# Patient Record
(demographics unavailable — no encounter records)

---

## 2025-01-07 NOTE — PHYSICAL EXAM
[TextEntry] : General: alert, well appearing, no distress HEENT: no hair thinning or alopecia, clear conjunctiva, no oral or nasal ulcers, no cervical lymphadenopathy Cardiac: S1+, S2+,normal rate and rhythm, no murmur, rubs or gallops Pulm: normal respiratory effort, clear to auscultation bilaterally GI: abdomen soft, non-tender and non-distended   MSK: Hand-DIP joints, PIP joints, , CMC joints without evidence of synovitis or effusion. Intact and nonpainful range of motion. No Heberden/Ciro nodes. Rt1st MCP joint tender+ Wrist, elbow, without evidence of synovitis or effusion. Intact and nonpainful range of motion. Rt shoulder joint: painful abduction past 90 deg. Positive Hawkin's and empty can test Foot/ankle/knee/hip joints without erythema/effusion/tenderness to palpation and with intact nonpainful range of motion. Spine: normal appearance, no tenderness, lateral flexion limited   Skin: No rashes, warm and well-perfused. No nail pitting, digital ulceration, dactylitis, or telangiectasias. No subcutaneous nodules.

## 2025-01-07 NOTE — REVIEW OF SYSTEMS
[TextEntry] :  Head: No Alopecia, no scalp pain, no temporal headaches, no hearing loss,+red/painful eyes, no dry eyes ,no dry mouth, no painless oral ulcers,no poor dentition,no jaw claudication ENT: No enlarged lymph nodes, no parotid swelling CVS: No Positional chest pain Pulm: No SOB, no cough, no pleuritic chest pain Abdomen:  No constipation,no diarrhea, no postprandial pain Skin: No rash,no dry skin,no tight skin,no nodules, no Raynaud's MSK: + joint pain,no joint swelling,no morning stiffness, no back pain :   no blood in urine Heme: No clots,no hx of low WBC,no hx of low Hb,no hx of low platelets Neuro: No tingling,no numbness,no foot drop, no weakness, no seizures, no temporal headaches Systemic: No fevers, no weight loss, no night sweats No H/o radiation therapy, no recent hospitalization

## 2025-01-07 NOTE — HISTORY OF PRESENT ILLNESS
[FreeTextEntry1] : 51yo M with PMHx of T2DM,GERD for past 20yrs, HLD, depression  self referred presents with complaints of joint pains, ?vision problem.  On fibrate  Lower back, rt>left hips, left shoulder, b/l PIP joints.  Hands: Pain started 8yrs back, activity makes it worse, no am stiffness?? Ring never been stuck  Rt hip pain worse with walking and better with rest. No PT. Cortisone shots helped to some extent.  Low back pain-started 10yrs back. Night time awakenings, Has pain not stiffness. Hard to get out of a car.  Left shoulder- lifting after a point is painful. Activity gets worse. No swelling.  No dactylitis,  Left eye- woke up with redness in corner. Pain+, tearing+ clear Went to opthal, was given prednisone. Saw Russel Zavala at [facility].

## 2025-01-07 NOTE — HISTORY OF PRESENT ILLNESS
[FreeTextEntry1] : 49yo M with PMHx of T2DM,GERD for past 20yrs, HLD, depression  self referred presents with complaints of joint pains, ?vision problem.  On fibrate  Lower back, rt>left hips, left shoulder, b/l PIP joints.  Hands: Pain started 8yrs back, activity makes it worse, no am stiffness?? Ring never been stuck  Rt hip pain worse with walking and better with rest. No PT. Cortisone shots helped to some extent.  Low back pain-started 10yrs back. Night time awakenings, Has pain not stiffness. Hard to get out of a car.  Left shoulder- lifting after a point is painful. Activity gets worse. No swelling.  No dactylitis,  Left eye- woke up with redness in corner. Pain+, tearing+ clear Went to opthal, was given prednisone. Saw Russel Zavala at [facility].

## 2025-01-07 NOTE — HISTORY OF PRESENT ILLNESS
[FreeTextEntry1] : 51yo M with PMHx of T2DM,GERD for past 20yrs, HLD, depression  self referred presents with complaints of joint pains, ?vision problem.  On fibrate  Lower back, rt>left hips, left shoulder, b/l PIP joints.  Hands: Pain started 8yrs back, activity makes it worse, no am stiffness?? Ring never been stuck  Rt hip pain worse with walking and better with rest. No PT. Cortisone shots helped to some extent.  Low back pain-started 10yrs back. Night time awakenings, Has pain not stiffness. Hard to get out of a car.  Left shoulder- lifting after a point is painful. Activity gets worse. No swelling.  No dactylitis,  Left eye- woke up with redness in corner. Pain+, tearing+ clear Went to opthal, was given prednisone. Saw Russel Zavala at [facility].

## 2025-02-12 NOTE — HISTORY OF PRESENT ILLNESS
[FreeTextEntry1] : 51yo M with PMHx of T2DM,GERD for past 20yrs, HLD, depression  self referred initially presented with complaints of joint pains, red eye. Today he is here for follow up.   Interim Hx: Today he says he gets pain and stiffness of b/l 3rd and 4th fingers lasting >1hr, movement makes it better. He is currently off prednisone, was told he does not have to be on prednisone by opthal.  Received notes from ophthalmology at [facility] diagnosed to have scleritis left eye, posterior vitreous detachment both eyes. Today he is assymptomatic. Had MRI pelvis done.  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  To follow-up with rheumatology for steroid sparing agent.  Pred forte was discontinued by ophthalmology. X-ray of left SI joint seems sclerotic, irregular compared to right.  Read as normal. Has hip pain that worsens with activity at times. History not clear.   Rheum initial Hx:  Lower back, rt>left hips, left shoulder, b/l PIP joints.  Hands: Pain started 8yrs back, activity makes it worse, no am stiffness?? Ring never been stuck  Rt hip pain worse with walking and better with rest. No PT. Cortisone shots helped to some extent.  Low back pain-started 10yrs back. Night time awakenings, Has pain not stiffness. Hard to get out of a car.  Left shoulder- lifting after a point is painful. Activity gets worse. No swelling.  No dactylitis,  Left eye- woke up with redness in corner. Pain+, tearing+ [facility] Saw Russel Zavala at [facility].

## 2025-02-12 NOTE — PHYSICAL EXAM
[TextEntry] : General: alert, well appearing, no distress HEENT: no hair thinning or alopecia, clear conjunctiva, no oral or nasal ulcers, no cervical lymphadenopathy Cardiac: S1+, S2+,normal rate and rhythm, no murmur, rubs or gallops Pulm: normal respiratory effort, clear to auscultation bilaterally GI: abdomen soft, non-tender and non-distended  MSK: Hand-DIP joints, PIP joints, , CMC joints without evidence of synovitis or effusion. Intact and nonpainful range of motion. No Heberden/Ciro nodes.  Wrist, elbow, without evidence of synovitis or effusion. Intact and nonpainful range of motion. Rt shoulder joint: painful abduction past 90 deg. Positive Hawkin's and empty can test Foot/ankle/knee/hip joints without erythema/effusion/tenderness to palpation and with intact nonpainful range of motion. Spine: normal appearance, no tenderness, lateral flexion limited  Skin: No rashes, warm and well-perfused. No nail pitting, digital ulceration, dactylitis, or telangiectasias. No subcutaneous nodules.

## 2025-02-12 NOTE — HISTORY OF PRESENT ILLNESS
[FreeTextEntry1] : 51yo M with PMHx of T2DM,GERD for past 20yrs, HLD, depression  self referred initially presented with complaints of joint pains, red eye. Today he is here for follow up.   Interim Hx: Today he says he gets pain and stiffness of b/l 3rd and 4th fingers lasting >1hr, movement makes it better. He is currently off prednisone, was told he does not have to be on prednisone by opthal.  Received notes from ophthalmology at [facility] diagnosed to have scleritis left eye, posterior vitreous detachment both eyes. Today he is assymptomatic. Had MRI pelvis done.  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  To follow-up with rheumatology for steroid sparing agent.  Pred forte was discontinued by ophthalmology. X-ray of left SI joint seems sclerotic, irregular compared to right.  Read as normal. Has hip pain that worsens with activity at times. History not clear.   Rheum initial Hx:  Lower back, rt>left hips, left shoulder, b/l PIP joints.  Hands: Pain started 8yrs back, activity makes it worse, no am stiffness?? Ring never been stuck  Rt hip pain worse with walking and better with rest. No PT. Cortisone shots helped to some extent.  Low back pain-started 10yrs back. Night time awakenings, Has pain not stiffness. Hard to get out of a car.  Left shoulder- lifting after a point is painful. Activity gets worse. No swelling.  No dactylitis,  Left eye- woke up with redness in corner. Pain+, tearing+ clear Went to [facility]", was given prednisone. Saw Russel Zavala at [facility].

## 2025-03-13 NOTE — HISTORY OF PRESENT ILLNESS
[FreeTextEntry1] : 51yo M with PMHx of T2DM,GERD for past 20yrs, HLD, depression  self referred initially presented with complaints of joint pains, red eye. Today he is here for follow up.   Interim Hx: Will discuss possible treatment with mtx if necessary. No sacroiliitis and minimal hip effusion b/l that does not seem significant. In the last visit, he reported he gets pain and stiffness of b/l 3rd and 4th fingers lasting >1hr, movement makes it better. He was off prednisone, was told he does not have to be on prednisone by opthal.  Received notes from ophthalmology at [facility] diagnosed to have scleritis left eye, posterior vitreous detachment both eyes. Today he is assymptomatic. Had MRI pelvis done.  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  To follow-up with rheumatology for steroid sparing agent.  Pred forte was discontinued by ophthalmology. X-ray of left SI joint seems sclerotic, irregular compared to right.  Read as normal. Has hip pain that worsens with activity at times. History not clear.   Rheum initial Hx:  Lower back, rt>left hips, left shoulder, b/l PIP joints.  Hands: Pain started 8yrs back, activity makes it worse, no am stiffness?? Ring never been stuck  Rt hip pain worse with walking and better with rest. No PT. Cortisone shots helped to some extent.  Low back pain-started 10yrs back. Night time awakenings, Has pain not stiffness. Hard to get out of a car.  Left shoulder- lifting after a point is painful. Activity gets worse. No swelling.  No dactylitis,  Left eye- woke up with redness in corner. Pain+, tearing+ clear Went to opthal, was given prednisone. Saw Russel Zavala at [facility].

## 2025-03-13 NOTE — PHYSICAL EXAM
[TextEntry] : General: alert, well appearing, no distress HEENT: no hair thinning or alopecia, clear conjunctiva, no oral or nasal ulcers, no cervical lymphadenopathy Cardiac: S1+, S2+,normal rate and rhythm, no murmur, rubs or gallops Pulm: normal respiratory effort, clear to auscultation bilaterally GI: abdomen soft, non-tender and non-distended  MSK: Hand-DIP joints, PIP joints, , CMC joints without evidence of synovitis or effusion. Intact and nonpainful range of motion. No Heberden/Ciro nodes.  Wrist, elbow, without evidence of synovitis or effusion. Intact and nonpainful range of motion. Rt shoulder joint: painful abduction past 90 deg. Positive Hawkin's and empty can test Foot/ankle/knee without erythema/effusion/tenderness to palpation and with intact nonpainful range of motion. Hips-tenderness over lateral side bilaterally Spine: normal appearance, no tenderness, lateral flexion limited  Skin: No rashes, warm and well-perfused. No nail pitting, digital ulceration, dactylitis, or telangiectasias. No subcutaneous nodules.

## 2025-03-13 NOTE — HISTORY OF PRESENT ILLNESS
[FreeTextEntry1] : 51yo M with PMHx of T2DM,GERD for past 20yrs, HLD, depression  self referred initially presented with complaints of joint pains, red eye. Today he is here for follow up.   Interim Hx: Will discuss possible treatment with mtx if necessary. No sacroiliitis and minimal hip effusion b/l that does not seem significant. In the last visit, he reported he gets pain and stiffness of b/l 3rd and 4th fingers lasting >1hr, movement makes it better. He was off prednisone, was told he does not have to be on prednisone by opthal.  Received notes from ophthalmology at [facility] diagnosed to have scleritis left eye, posterior vitreous detachment both eyes. Today he is assymptomatic. Had MRI pelvis done.  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  To follow-up with rheumatology for steroid sparing agent.  Pred forte was discontinued by ophthalmology. X-ray of left SI joint seems sclerotic, irregular compared to right.  Read as normal. Has hip pain that worsens with activity at times. History not clear.   Rheum initial Hx:  Lower back, rt>left hips, left shoulder, b/l PIP joints.  Hands: Pain started 8yrs back, activity makes it worse, no am stiffness?? Ring never been stuck  Rt hip pain worse with walking and better with rest. No PT. Cortisone shots helped to some extent.  Low back pain-started 10yrs back. Night time awakenings, Has pain not stiffness. Hard to get out of a car.  Left shoulder- lifting after a point is painful. Activity gets worse. No swelling.  No dactylitis,  Left eye- woke up with redness in corner. Pain+, tearing+ clear Went to opthal, was given prednisone. Saw Russel Zavala at [facility].

## 2025-04-10 NOTE — HISTORY OF PRESENT ILLNESS
[FreeTextEntry1] : 49yo M with PMHx of T2DM,GERD for past 20yrs, HLD, depression  self referred initially presented with complaints of joint pains, red eye. Today he is here for follow up.   Interim Hx: Says he is better but not completely. Left ankle pain after sprain 2 wks back.  Stepped over uneven surface while in [place].  Hip pain is also better.  Stiffness of fingers little better. Was taking tylenol for pain. No sacroiliitis and minimal hip effusion b/l that does not seem significant.   Rheum initial Hx:  Lower back, rt>left hips, left shoulder, b/l PIP joints.  Hands: Pain started 8yrs back, activity makes it worse, no am stiffness?? Ring never been stuck  Rt hip pain worse with walking and better with rest. No PT. Cortisone shots helped to some extent.  Low back pain-started 10yrs back. Night time awakenings, Has pain not stiffness. Hard to get out of a car.  Left shoulder- lifting after a point is painful. Activity gets worse. No swelling.  No dactylitis,  Left eye- woke up with redness in corner. Pain+, tearing+ clear Went to opthal, was given prednisone. Saw Russel Zavala at [facility].  he reported he gets pain and stiffness of b/l 3rd and 4th fingers lasting >1hr, movement makes it better. He was off prednisone, was told he does not have to be on prednisone by opthal.  Received notes from ophthalmology at [facility] diagnosed to have scleritis left eye, posterior vitreous detachment both eyes. Today he is assymptomatic. Had MRI pelvis done.  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  To follow-up with rheumatology for steroid sparing agent.  Pred forte was discontinued by ophthalmology. X-ray of left SI joint seems sclerotic, irregular compared to right.  Read as normal. Has hip pain that worsens with activity at times. History not clear.

## 2025-04-10 NOTE — HISTORY OF PRESENT ILLNESS
[FreeTextEntry1] : 51yo M with PMHx of T2DM,GERD for past 20yrs, HLD, depression  self referred initially presented with complaints of joint pains, red eye. Today he is here for follow up.   Interim Hx: Says he is better but not completely. Left ankle pain after sprain 2 wks back.  Stepped over uneven surface while in [place].  Hip pain is also better.  Stiffness of fingers little better. Was taking tylenol for pain. No sacroiliitis and minimal hip effusion b/l that does not seem significant.   Rheum initial Hx:  Lower back, rt>left hips, left shoulder, b/l PIP joints.  Hands: Pain started 8yrs back, activity makes it worse, no am stiffness?? Ring never been stuck  Rt hip pain worse with walking and better with rest. No PT. Cortisone shots helped to some extent.  Low back pain-started 10yrs back. Night time awakenings, Has pain not stiffness. Hard to get out of a car.  Left shoulder- lifting after a point is painful. Activity gets worse. No swelling.  No dactylitis,  Left eye- woke up with redness in corner. Pain+, tearing+ clear Went to opthal, was given prednisone. Saw Russel Zavala at [facility].  he reported he gets pain and stiffness of b/l 3rd and 4th fingers lasting >1hr, movement makes it better. He was off prednisone, was told he does not have to be on prednisone by opthal.  Received notes from ophthalmology at [facility] diagnosed to have scleritis left eye, posterior vitreous detachment both eyes. Today he is assymptomatic. Had MRI pelvis done.  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  To follow-up with rheumatology for steroid sparing agent.  Pred forte was discontinued by ophthalmology. X-ray of left SI joint seems sclerotic, irregular compared to right.  Read as normal. Has hip pain that worsens with activity at times. History not clear.

## 2025-04-10 NOTE — PHYSICAL EXAM
[TextEntry] : General: alert, well appearing, no distress HEENT: no hair thinning or alopecia, clear conjunctiva, no oral or nasal ulcers, no cervical lymphadenopathy Cardiac: S1+, S2+,normal rate and rhythm, no murmur, rubs or gallops Pulm: normal respiratory effort, clear to auscultation bilaterally GI: abdomen soft, non-tender and non-distended  MSK: Hand-DIP joints, PIP joints, , CMC joints without evidence of synovitis or effusion. Intact and nonpainful range of motion. No Heberden/Ciro nodes.  Wrist, elbow, without evidence of synovitis or effusion. Intact and nonpainful range of motion. Rt shoulder joint: painful abduction past 90 deg. Positive Hawkin's and empty can test Foot/knee without erythema/effusion/tenderness to palpation and with intact nonpainful range of motion. Left ankle-swelling and tenderness along lateral malleolus Hips-tenderness over lateral side bilaterally Spine: normal appearance, no tenderness, lateral flexion limited  Skin: No rashes, warm and well-perfused. No nail pitting, digital ulceration, dactylitis, or telangiectasias. No subcutaneous nodules.

## 2025-05-08 NOTE — HISTORY OF PRESENT ILLNESS
[FreeTextEntry1] : 49yo M with PMHx of DEJUAN not on CPAP, T2DM,GERD for past 20yrs, HLD, depression  self referred initially presented with complaints of joint pains, red eye. Today he is here for follow up.   Interim Hx: On methotrexate.  Said he was feeling better in the last visit but today he does not feel he is better.  Says he is back to how it was in the beginning.  He also mentions that he does not sleep well at night and does not use CPAP.  No sacroiliitis and minimal hip effusion b/l that does not seem significant.   Rheum initial Hx:  Lower back, rt>left hips, left shoulder, b/l PIP joints.  Hands: Pain started 8yrs back, activity makes it worse, no am stiffness?? Ring never been stuck  Rt hip pain worse with walking and better with rest. No PT. Cortisone shots helped to some extent.  Low back pain-started 10yrs back. Night time awakenings, Has pain not stiffness. Hard to get out of a car.  Left shoulder- lifting after a point is painful. Activity gets worse. No swelling.  No dactylitis,  Left eye- woke up with redness in corner. Pain+, tearing+ clear Went to opthal, was given prednisone. Saw Russel Zavala at [facility].  he reported he gets pain and stiffness of b/l 3rd and 4th fingers lasting >1hr, movement makes it better. He was off prednisone, was told he does not have to be on prednisone by opthal.  Received notes from ophthalmology at [facility] diagnosed to have scleritis left eye, posterior vitreous detachment both eyes. Today he is assymptomatic. Had MRI pelvis done.  In the last visit in Jan 2025, Dr. 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  Pred forte was discontinued by ophthalmology. X-ray of left SI joint seems sclerotic, irregular compared to right.  Read as normal. Has hip pain that worsens with activity at times. History not clear.   He was started on methotrexate/folic acid.  After 4 weeks he told me that he is feeling better overall.  Then at 8 weeks he said his joint symptoms are all back.  He has DEJUAN, does not sleep well.

## 2025-05-08 NOTE — HISTORY OF PRESENT ILLNESS
[FreeTextEntry1] : 49yo M with PMHx of DEJUAN not on CPAP, T2DM,GERD for past 20yrs, HLD, depression  self referred initially presented with complaints of joint pains, red eye. Today he is here for follow up.   Interim Hx: On methotrexate.  Said he was feeling better in the last visit but today he does not feel he is better.  Says he is back to how it was in the beginning.  He also mentions that he does not sleep well at night and does not use CPAP.  No sacroiliitis and minimal hip effusion b/l that does not seem significant.   Rheum initial Hx:  Lower back, rt>left hips, left shoulder, b/l PIP joints.  Hands: Pain started 8yrs back, activity makes it worse, no am stiffness?? Ring never been stuck  Rt hip pain worse with walking and better with rest. No PT. Cortisone shots helped to some extent.  Low back pain-started 10yrs back. Night time awakenings, Has pain not stiffness. Hard to get out of a car.  Left shoulder- lifting after a point is painful. Activity gets worse. No swelling.  No dactylitis,  Left eye- woke up with redness in corner. Pain+, tearing+ clear Went to opthal, was given prednisone. Saw Russel Zavala at [facility].  he reported he gets pain and stiffness of b/l 3rd and 4th fingers lasting >1hr, movement makes it better. He was off prednisone, was told he does not have to be on prednisone by opthal.  Received notes from ophthalmology at [facility] diagnosed to have scleritis left eye, posterior vitreous detachment both eyes. Today he is assymptomatic. Had MRI pelvis done.  In the last visit in Jan 2025, Dr. 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  Pred forte was discontinued by ophthalmology. X-ray of left SI joint seems sclerotic, irregular compared to right.  Read as normal. Has hip pain that worsens with activity at times. History not clear.   He was started on methotrexate/folic acid.  After 4 weeks he told me that he is feeling better overall.  Then at 8 weeks he said his joint symptoms are all back.  He has DEJUAN, does not sleep well.

## 2025-05-08 NOTE — PHYSICAL EXAM
[TextEntry] : General: alert, well appearing, no distress HEENT: no hair thinning or alopecia, clear conjunctiva, no oral or nasal ulcers, no cervical lymphadenopathy Cardiac: S1+, S2+,normal rate and rhythm, no murmur, rubs or gallops Pulm: normal respiratory effort, clear to auscultation bilaterally GI: abdomen soft, non-tender and non-distended   MSK: Hand-DIP joints, PIP joints, MCP joints, CMC joints without evidence of synovitis or effusion. Intact and nonpainful range of motion. No Heberden/Ciro nodes. Wrist, elbow, and shoulder joints without evidence of synovitis or effusion. Intact and nonpainful range of motion. Foot/ankle/knee/hip joints without erythema/effusion/tenderness to palpation and with intact nonpainful range of motion. Spine: normal appearance, no tenderness, normal ROM   Skin: No rashes, warm and well-perfused. No nail pitting, digital ulceration, dactylitis, or telangiectasias. No subcutaneous nodules.

## 2025-06-20 NOTE — HISTORY OF PRESENT ILLNESS
[FreeTextEntry1] : 49yo M with PMHx of DEJUAN not on CPAP, T2DM,GERD for past 20yrs, HLD, depression  self referred initially presented with complaints of joint pains, red eye. Today he is here for follow up.   Interim Hx: On methotrexate.  Says fingers feel better but gets pain side of both hips. He walks on concrete all day. Prolonged sitting causes gelling.  Low back pain-has had ablation by spine surgeon. Had MRI done.  Said he was feeling better in the last visit but today he does not feel he is better.  Says he is back to how it was in the beginning.  He also mentions that he does not sleep well at night and does not use CPAP.  No sacroiliitis and minimal hip effusion b/l that does not seem significant.   Rheum initial Hx:  Lower back, rt>left hips, left shoulder, b/l PIP joints.  Hands: Pain started 8yrs back, activity makes it worse, no am stiffness?? Ring never been stuck  Rt hip pain worse with walking and better with rest. No PT. Cortisone shots helped to some extent.  Low back pain-started 10yrs back. Night time awakenings, Has pain not stiffness. Hard to get out of a car.  Left shoulder- lifting after a point is painful. Activity gets worse. No swelling.  No dactylitis,  Left eye- woke up with redness in corner. Pain+, tearing+ clear Went to opthal, was given prednisone. Saw Russel Zavala at [facility].  he reported he gets pain and stiffness of b/l 3rd and 4th fingers lasting >1hr, movement makes it better. He was off prednisone, was told he does not have to be on prednisone by opthal.  Received notes from ophthalmology at [facility] diagnosed to have scleritis left eye, posterior vitreous detachment both eyes. Today he is assymptomatic. Had MRI pelvis done.  In the last visit in Jan 2025, Dr. 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  Pred forte was discontinued by ophthalmology. X-ray of left SI joint seems sclerotic, irregular compared to right.  Read as normal. Has hip pain that worsens with activity at times. History not clear.   He was started on methotrexate/folic acid.  After 4 weeks he told me that he is feeling better overall.  Then at 8 weeks he said his joint symptoms are all back.  He has DEJUAN, does not sleep well.

## 2025-06-20 NOTE — HISTORY OF PRESENT ILLNESS
[FreeTextEntry1] : 51yo M with PMHx of DEJUAN not on CPAP, T2DM,GERD for past 20yrs, HLD, depression  self referred initially presented with complaints of joint pains, red eye. Today he is here for follow up.   Interim Hx: On methotrexate.  Says fingers feel better but gets pain side of both hips. He walks on concrete all day. Prolonged sitting causes gelling.  Low back pain-has had ablation by spine surgeon. Had MRI done.  Said he was feeling better in the last visit but today he does not feel he is better.  Says he is back to how it was in the beginning.  He also mentions that he does not sleep well at night and does not use CPAP.  No sacroiliitis and minimal hip effusion b/l that does not seem significant.   Rheum initial Hx:  Lower back, rt>left hips, left shoulder, b/l PIP joints.  Hands: Pain started 8yrs back, activity makes it worse, no am stiffness?? Ring never been stuck  Rt hip pain worse with walking and better with rest. No PT. Cortisone shots helped to some extent.  Low back pain-started 10yrs back. Night time awakenings, Has pain not stiffness. Hard to get out of a car.  Left shoulder- lifting after a point is painful. Activity gets worse. No swelling.  No dactylitis,  Left eye- woke up with redness in corner. Pain+, tearing+ clear Went to opthal, was given prednisone. Saw Russel Zavala at [facility].  he reported he gets pain and stiffness of b/l 3rd and 4th fingers lasting >1hr, movement makes it better. He was off prednisone, was told he does not have to be on prednisone by opthal.  Received notes from ophthalmology at [email or web address] diagnosed to have scleritis left eye, posterior vitreous detachment both eyes. Today he is assymptomatic. Had MRI pelvis done.  In the last visit in Jan 2025, Dr. 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  Pred forte was discontinued by ophthalmology. X-ray of left SI joint seems sclerotic, irregular compared to right.  Read as normal. Has hip pain that worsens with activity at times. History not clear.   He was started on methotrexate/folic acid.  After 4 weeks he told me that he is feeling better overall.  Then at 8 weeks he said his joint symptoms are all back.  He has DEJUAN, does not sleep well.

## 2025-06-20 NOTE — REVIEW OF SYSTEMS
[TextEntry] :   Head: No Alopecia, no scalp pain, no temporal headaches, no hearing loss, no red/painful eyes, no dry eyes ,no dry mouth, no painless oral ulcers,no poor dentition,no jaw claudication ENT: No enlarged lymph nodes, no parotid swelling,no dysphagia CVS: No Positional chest pain Pulm: No SOB, no cough, no hemoptysis, no pleuritic chest pain Abdomen:  No constipation,no diarrhea, no postprandial pain Skin: No rash,no dry skin,no tight skin,no nodules, no Raynaud's MSK: no joint swelling,no morning stiffness :  no blood in urine Heme: No clots,no hx of low WBC,no hx of low Hb,no hx of low platelets Neuro: No tingling,no numbness,no foot drop, no weakness, no seizures, no temporal headaches Systemic: No fevers, no weight loss, no night sweats No H/o radiation therapy, no recent hospitalization

## 2025-06-20 NOTE — HISTORY OF PRESENT ILLNESS
[FreeTextEntry1] : 49yo M with PMHx of DEJUAN not on CPAP, T2DM,GERD for past 20yrs, HLD, depression  self referred initially presented with complaints of joint pains, red eye. Today he is here for follow up.   Interim Hx: On methotrexate.  Says fingers feel better but gets pain side of both hips. He walks on concrete all day. Prolonged sitting causes gelling.  Low back pain-has had ablation by spine surgeon. Had MRI done.  Said he was feeling better in the last visit but today he does not feel he is better.  Says he is back to how it was in the beginning.  He also mentions that he does not sleep well at night and does not use CPAP.  No sacroiliitis and minimal hip effusion b/l that does not seem significant.   Rheum initial Hx:  Lower back, rt>left hips, left shoulder, b/l PIP joints.  Hands: Pain started 8yrs back, activity makes it worse, no am stiffness?? Ring never been stuck  Rt hip pain worse with walking and better with rest. No PT. Cortisone shots helped to some extent.  Low back pain-started 10yrs back. Night time awakenings, Has pain not stiffness. Hard to get out of a car.  Left shoulder- lifting after a point is painful. Activity gets worse. No swelling.  No dactylitis,  Left eye- woke up with redness in corner. Pain+, tearing+ clear Went to opthal, was given prednisone. Saw Russel Zavala at [facility].  he reported he gets pain and stiffness of b/l 3rd and 4th fingers lasting >1hr, movement makes it better. He was off prednisone, was told he does not have to be on prednisone by opthal.  Received notes from ophthalmology at [email or web address] diagnosed to have scleritis left eye, posterior vitreous detachment both eyes. Today he is assymptomatic. Had MRI pelvis done.  In the last visit in Jan 2025, Dr. 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  Pred forte was discontinued by ophthalmology. X-ray of left SI joint seems sclerotic, irregular compared to right.  Read as normal. Has hip pain that worsens with activity at times. History not clear.   He was started on methotrexate/folic acid.  After 4 weeks he told me that he is feeling better overall.  Then at 8 weeks he said his joint symptoms are all back.  He has DEJUAN, does not sleep well.

## 2025-07-27 NOTE — REVIEW OF SYSTEMS
[TextEntry] :   Head: No Alopecia, no scalp pain, no temporal headaches, no hearing loss, no red/painful eyes, no dry eyes ,no dry mouth, no painless oral ulcers,no poor dentition,no jaw claudication ENT: No enlarged lymph nodes, no parotid swelling,no dysphagia CVS: No Positional chest pain Pulm: No SOB, no cough, no hemoptysis, no pleuritic chest pain Abdomen:  No constipation,no diarrhea, no postprandial pain Skin: No rash,no dry skin,no tight skin,no nodules, no Raynaud's MSK:no joint swelling,no morning stiffness, no back pain :  no blood in urine Heme: No clots,no hx of low WBC,no hx of low Hb,no hx of low platelets Neuro: No tingling,no numbness,no foot drop, no weakness, no seizures, no temporal headaches Systemic: No fevers, no weight loss, no night sweats No H/o radiation therapy, no recent hospitalization

## 2025-07-27 NOTE — HISTORY OF PRESENT ILLNESS
[FreeTextEntry1] : 51yo M with PMHx of DEJUAN not on CPAP, T2DM,GERD for past 20yrs, HLD, depression  self referred initially presented with complaints of joint pains, red eye. Today he is here for follow up.   Interim Hx: On methotrexate.  ESR normal. ?PMR  Says fingers feel better but gets pain side of both hips. He walks on concrete all day. Prolonged sitting causes gelling.  Low back pain-has had ablation by spine surgeon. Had MRI done.  Said he was feeling better in the last visit but today he does not feel he is better.  Says he is back to how it was in the beginning.  He also mentions that he does not sleep well at night and does not use CPAP.  No sacroiliitis and minimal hip effusion b/l that does not seem significant.   Rheum initial Hx:  Lower back, rt>left hips, left shoulder, b/l PIP joints.  Hands: Pain started 8yrs back, activity makes it worse, no am stiffness?? Ring never been stuck  Rt hip pain worse with walking and better with rest. No PT. Cortisone shots helped to some extent.  Low back pain-started 10yrs back. Night time awakenings, Has pain not stiffness. Hard to get out of a car.  Left shoulder- lifting after a point is painful. Activity gets worse. No swelling.  No dactylitis,  Left eye- woke up with redness in corner. Pain+, tearing+ clear Went to opthal, was given prednisone. Saw Russel Zavala at [facility].  he reported he gets pain and stiffness of b/l 3rd and 4th fingers lasting >1hr, movement makes it better. He was off prednisone, was told he does not have to be on prednisone by opthal.  Received notes from ophthalmology at [facility] diagnosed to have scleritis left eye, posterior vitreous detachment both eyes. Today he is assymptomatic. Had MRI pelvis done.  In the last visit in Jan 2025, Dr. 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  Pred forte was discontinued by ophthalmology. X-ray of left SI joint seems sclerotic, irregular compared to right.  Read as normal. Has hip pain that worsens with activity at times. History not clear.   He was started on methotrexate/folic acid.  After 4 weeks he told me that he is feeling better overall.  Then at 8 weeks he said his joint symptoms are all back.  He has DEJUAN, does not sleep well.